# Patient Record
Sex: FEMALE | Race: WHITE
[De-identification: names, ages, dates, MRNs, and addresses within clinical notes are randomized per-mention and may not be internally consistent; named-entity substitution may affect disease eponyms.]

---

## 2021-02-25 ENCOUNTER — HOSPITAL ENCOUNTER (OUTPATIENT)
Dept: HOSPITAL 56 - MW.SDS | Age: 26
Discharge: HOME | End: 2021-02-25
Attending: SURGERY
Payer: COMMERCIAL

## 2021-02-25 VITALS — HEART RATE: 70 BPM | DIASTOLIC BLOOD PRESSURE: 55 MMHG | SYSTOLIC BLOOD PRESSURE: 98 MMHG

## 2021-02-25 DIAGNOSIS — K82.8: ICD-10-CM

## 2021-02-25 DIAGNOSIS — K81.1: Primary | ICD-10-CM

## 2021-02-25 DIAGNOSIS — Z98.890: ICD-10-CM

## 2021-02-25 DIAGNOSIS — E66.9: ICD-10-CM

## 2021-02-25 DIAGNOSIS — K66.0: ICD-10-CM

## 2021-02-25 PROCEDURE — 81025 URINE PREGNANCY TEST: CPT

## 2021-02-25 PROCEDURE — 47562 LAPAROSCOPIC CHOLECYSTECTOMY: CPT

## 2021-02-25 PROCEDURE — 88304 TISSUE EXAM BY PATHOLOGIST: CPT

## 2021-02-25 NOTE — OR
SURGEON:

BEATRIZ DUGAN MD

 

DATE OF PROCEDURE:  02/25/2021

 

PREOPERATIVE DIAGNOSIS:

Biliary dyskinesia.

 

POSTOPERATIVE DIAGNOSIS:

Biliary dyskinesia.

 

PROCEDURE PERFORMED:

Laparoscopic cholecystectomy.

 

PRIMARY SURGEON:

Beatriz Dugan MD

 

ANESTHESIA:

General endotracheal anesthesia.

 

FLUIDS:

1500 mL of crystalloid.

 

ESTIMATED BLOOD LOSS:

5 mL.

 

URINE OUTPUT:

500 mL.

 

FINDINGS:

Normal-appearing gallbladder with adhesions to the surrounding omentum.

 

COMPLICATIONS:

None.

 

INDICATIONS FOR EXAMINATION:

The patient is a 25-year-old female who presented to the clinic with biliary

dyskinesia.  I explained the need for a cholecystectomy.  I would attempt it

laparoscopic, but convert to open should I be unable to perform it safely.  I

explained the expected perioperative course as well as the risks including

bleeding, infection, or damage to surrounding structures.  The patient

verbalized understanding and wishes to proceed.

 

PROCEDURE IN DETAIL:

The patient was brought into the OR and placed on the OR table in supine

position.  A time-out was completed verifying the patient's name, age, date of

birth, allergies, and procedure to be performed.  General endotracheal

anesthesia was induced.  The left arm was tucked to the patient's side and a

Pop catheter placed.  The abdomen was prepped and draped in usual standard

fashion.  I anesthetized the infraumbilical fold with 0.5% Marcaine plain.  An

11 blade was used to make an incision along the fold.  Electrocautery was used

to dissect down to the level of subcutaneous fat.  I bluntly dissected down to

the fascia.  The fascia was elevated with Kocher's and incised sharply with

curved Holden scissors.  I then dissected down to the peritoneum.  The peritoneum

was entered bluntly using a hemostat.  Entry into the abdomen was palpated

digitally.  A 12 mm Carter trocar was inserted, and I insufflated the abdomen.

A 5 mm 30-degree scope was inserted, and I inspected the area underneath my

initial trocar placement.  No damage to surrounding structures was noted.  The

patient was placed into reverse Trendelenburg position and airplaned slightly to

the left.  5 mm trocars placed in the following locations under direct

visualization; one in the epigastric area, one in the right flank, and one 2

fingerbreadths below the right subcostal margin in the midclavicular line.  The

dome of the gallbladder was grasped and elevated anteriorly.  There were

adhesions to the surrounding omentum along the body and infundibulum of the

gallbladder.  These were taken down with blunt dissection.  The infundibulum was

grasped, and I began dissection along the critical structures.  Using a

combination of hook cautery and gentle blunt dissection, I was able to dissect

out the cystic duct and artery as well as the proximal one-third of the cystic

plate.  Once my critical view was achieved, a photograph was taken.  I triply

clipped and ligated the cystic duct.  I doubly clipped and ligated the cystic

artery.  The remainder of the attachments of the gallbladder to the cystic plate

were taken down using electrocautery.  Once the gallbladder was free from the

liver bed, it was placed in an Endo Catch bag and removed through the

infraumbilical port site.  I inspected my operative field.  There was no

evidence of bleeding or bile leakage.  A photograph was taken.  The 5 mm trocars

were then removed under direct visualization and the abdomen desufflated.  The

12 mm trocar was removed as well.  The fascia at the infraumbilical port site

was closed with interrupted 0 Vicryl sutures.  Subcutaneous fat layer was closed

with interrupted 3-0 Vicryl stitches.  The skin was closed with a running 4-0

Monocryl stitch.  The 5 mm trocar sites were closed with interrupted 4-0

Monocryl sutures.  Dermabond and sterile dressings were applied.  All counts

were complete and correct at the end of the case.  The patient was extubated and

taken to PACU in stable condition.

 

 

DAWN DEL TORO

DD:  02/25/2021 10:40:52

DT:  02/25/2021 12:38:52

Job #:  [id number]

## 2021-02-25 NOTE — PCM.POSTAN
POST ANESTHESIA ASSESSMENT





- MENTAL STATUS


Mental Status: Alert, Oriented





- VITAL SIGNS


Vital Signs: 


                                Last Vital Signs











Temp  98.1 F   02/25/21 10:13


 


Pulse  61   02/25/21 11:04


 


Resp  16   02/25/21 11:04


 


BP  93/53 L  02/25/21 11:04


 


Pulse Ox  93 L  02/25/21 11:04














- RESPIRATORY


Respiratory Status: Respiratory Rate WNL, Airway Patent, O2 Saturation Stable, 

Supplemental Oxygen





- CARDIOVASCULAR


CV Status: Pulse Rate WNL, Blood Pressure Stable





- GASTROINTESTINAL


GI Status: No Symptoms





- POST OP HYDRATION


Hydration Status: Adequate & Stable

## 2021-02-25 NOTE — PCM48HPAN
Post Anesthesia Note





- EVALUATION WITHIN 48HRS OF ANESTHETIC


Vital Signs in Normal Range: Yes


Patient Participated in Evaluation: Yes


Respiratory Function Stable: Yes


Airway Patent: Yes


Cardiovascular Function Stable: Yes


Hydration Status Stable: Yes


Pain Control Satisfactory: Yes


Nausea and Vomiting Control Satisfactory: Yes


Mental Status Recovered: Yes


Vital Signs: 


                                Last Vital Signs











Temp  98.1 F   02/25/21 10:13


 


Pulse  61   02/25/21 11:04


 


Resp  16   02/25/21 11:04


 


BP  93/53 L  02/25/21 11:04


 


Pulse Ox  93 L  02/25/21 11:04

## 2021-02-25 NOTE — PCM.OPNOTE
<Ciera Angulo - Last Filed: 02/25/21 10:11>





- General Post-Op/Procedure Note


Date of Surgery/Procedure: 02/25/21


Operative Procedure(s): laparoscopic cholecystectomy


Findings: 





normal gallbladder anatomy with omental fibrosis


Pre Op Diagnosis: biliary dyskinesia


Post-Op Diagnosis: biliary dyskinesia


Anesthesia Technique: General ET Tube


Fluid Replacement, Intraop: 0


EBL in mLs: 5


Condition: Good





<Beatriz Montes - Last Filed: 02/25/21 10:34>





- General Post-Op/Procedure Note


Findings: 


Normal appearing gallbladder with adhesions to the surrounding omentum 


Fluid Replacement, Intraop: 1,500


Output, Urine Amount: 500


Free Text/Narrative:: 


                                 Intake & Output











 02/24/21 02/25/21 02/25/21





 22:59 06:59 14:59


 


Intake Total   0


 


Balance   0

## 2021-02-25 NOTE — PCM.PREANE
Preanesthetic Assessment





- Anesthesia/Transfusion/Family Hx


Anesthesia History: No Prior Anesthesia


Family History of Anesthesia Reaction: No


Transfusion History: No Prior Transfusion(s)





- Review of Systems


General: No Symptoms


Pulmonary: No Symptoms


Cardiovascular: No Symptoms


Gastrointestinal: No Symptoms


Neurological: No Symptoms


Other: Reports: None





- Physical Assessment


Vital Signs: 





                                Last Vital Signs











Temp  97.7 F   02/25/21 07:11


 


Pulse  57 L  02/25/21 07:11


 


Resp  16   02/25/21 07:11


 


BP  105/57 L  02/25/21 07:11


 


Pulse Ox  96   02/25/21 07:11











Height: 5 ft 5 in


Weight: 92.533 kg


ASA Class: 2


Mental Status: Alert & Oriented x3


Airway Class: Mallampati = 2


Dentition: Reports: Normal Dentition


ROM/Head Extension: Full


Lungs: Clear to Auscultation, Normal Respiratory Effort


Cardiovascular: Regular Rate, Regular Rhythm





- Lab


Values: 





                             Laboratory Last Values











Urine HCG, Qual  NEGATIVE  (NEGATIVE)   02/25/21  06:40    














- Allergies


Allergies/Adverse Reactions: 


                                    Allergies











Allergy/AdvReac Type Severity Reaction Status Date / Time


 


No Known Allergies Allergy   Verified 02/25/21 07:09














- Blood


Blood Available: No





- Anesthesia Plan


Pre-Op Medication Ordered: None





- Acknowledgements


Anesthesia Type Planned: General Anesthesia


Pt an Appropriate Candidate for the Planned Anesthesia: Yes


Alternatives and Risks of Anesthesia Discussed w Pt/Guardian: Yes


Pt/Guardian Understands and Agrees with Anesthesia Plan: Yes





PreAnesthesia Questionnaire


HEENT History: Reports: Other (See Below)


Other HEENT History: wears contacts/glasses


Cardiovascular History: Reports: None


Respiratory History: Reports: None


Gastrointestinal History: Reports: None


Genitourinary History: Reports: None


OB/GYN History: Reports: Pregnancy


Musculoskeletal History: Reports: None


Neurological History: Reports: None


Psychiatric History: Reports: Depression


Endocrine/Metabolic History: Reports: Obesity/BMI 30+


Hematologic History: Reports: None


Immunologic History: Reports: None


Oncologic (Cancer) History: Reports: None


Dermatologic History: Reports: None





- Past Surgical History


Head Surgeries/Procedures: Reports: None


HEENT Surgical History: Reports: Myringotomy w Tube(s)


Cardiovascular Surgical History: Reports: None


GI Surgical History: Reports: Other (See Below)


Other GI Surgeries/Procedures: radiofrequency ablation greater saphenous veins 

in 2018 in [place]


Female  Surgical History: Reports: None


Endocrine Surgical History: Reports: None


Neurological Surgical History: Reports: None


Musculoskeletal Surgical History: Reports: None


Oncologic Surgical History: Reports: None


Dermatological Surgical History: Reports: None





- SUBSTANCE USE


Tobacco Use Status *Q: Never Tobacco User





- HOME MEDS


Home Medications: 


                                    Home Meds





Scopolamine 1 patch TRDERM ONETIME 02/24/21 [History]











- CURRENT (IN HOUSE) MEDS


Current Meds: 





                               Current Medications





Fentanyl (Sublimaze)  50 mcg IVPUSH Q5M PRN


   PRN Reason: Pain


Lactated Ringer's (Ringers, Lactated)  1,000 mls @ 125 mls/hr IV ASDIRECTED MARTIN


   Last Admin: 02/25/21 07:08 Dose:  125 mls/hr


   Documented by: 


Acetaminophen 1,000 mg/ Premix  100 mls @ 400 mls/hr IV Q6H PRN


   PRN Reason: Pain


Sodium Chloride (Saline Flush)  2.5 ml FLUSH ASDIRECTED PRN


   PRN Reason: Keep Vein Open


Sodium Chloride (Normal Saline)  10 ml IV ASDIRECTED PRN


   PRN Reason: IV Use


Sodium Chloride (Saline Flush)  10 ml FLUSH ASDIRECTED PRN


   PRN Reason: Keep Vein Open





Discontinued Medications





Cefazolin Sodium (Ancef) Confirm Administered Dose 2 gm .ROUTE .STK-MED ONE


   Stop: 02/25/21 07:14


Fentanyl (Sublimaze) Confirm Administered Dose 250 mcg .ROUTE .STK-MED ONE


   Stop: 02/25/21 07:00


Glycopyrrolate (Robinul) Confirm Administered Dose 0.8 mg .ROUTE .STK-MED ONE


   Stop: 02/25/21 07:01


Cefazolin Sodium/Dextrose 2 gm (/ Premix)  50 mls @ 100 mls/hr IV ONETIME ONE


   Stop: 02/22/21 09:54


Sodium Chloride (Normal Saline) Confirm Administered Dose 20 mls @ as directed 

.ROUTE .STK-MED ONE


   Stop: 02/25/21 07:14


Ketorolac Tromethamine (Toradol) Confirm Administered Dose 30 mg .ROUTE .STK-MED

 ONE


   Stop: 02/25/21 07:01


Lidocaine (Xylocaine-Mpf 2%) Confirm Administered Dose 5 ml .ROUTE .STK-MED ONE


   Stop: 02/25/21 07:01


Midazolam HCl (Versed 1 Mg/Ml) Confirm Administered Dose 2 mg .ROUTE .STK-MED 

ONE


   Stop: 02/25/21 06:59


Ondansetron HCl (Zofran) Confirm Administered Dose 4 mg .ROUTE .STK-MED ONE


   Stop: 02/25/21 07:01


Propofol (Diprivan  20 Ml) Confirm Administered Dose 200 mg .ROUTE .STK-MED ONE


   Stop: 02/25/21 06:59


Rocuronium Bromide (Rocuronium Bromide) Confirm Administered Dose 50 mg .ROUTE 

.[employer] Gaming Worlds[employer] ONE


   Stop: 02/25/21 07:01